# Patient Record
Sex: FEMALE | NOT HISPANIC OR LATINO
[De-identification: names, ages, dates, MRNs, and addresses within clinical notes are randomized per-mention and may not be internally consistent; named-entity substitution may affect disease eponyms.]

---

## 2017-08-19 ENCOUNTER — LAB SERVICES (OUTPATIENT)
Dept: OTHER | Age: 67
End: 2017-08-19

## 2017-08-19 ENCOUNTER — CHARTING TRANS (OUTPATIENT)
Dept: URGENT CARE | Age: 67
End: 2017-08-19

## 2017-08-19 LAB
BILIRUBIN URINE: NEGATIVE
BLOOD URINE: ABNORMAL
CLARITY: ABNORMAL
COLOR: ABNORMAL
GLUCOSE QUALITATIVE U: NEGATIVE
KETONES, URINE: NEGATIVE
LEUKOCYTE ESTERASE URINE: ABNORMAL
NITRITE URINE: NEGATIVE
PH URINE: 7 (ref 5–7)
SPECIFIC GRAVITY URINE: 1.01 (ref 1–1.03)
URINE PROTEIN, QUAL (DIPSTICK): 30
UROBILINOGEN URINE: 0.2

## 2017-08-19 ASSESSMENT — PAIN SCALES - GENERAL: PAINLEVEL_OUTOF10: 0

## 2018-11-28 VITALS
SYSTOLIC BLOOD PRESSURE: 130 MMHG | OXYGEN SATURATION: 96 % | TEMPERATURE: 97.9 F | RESPIRATION RATE: 16 BRPM | DIASTOLIC BLOOD PRESSURE: 84 MMHG | HEART RATE: 68 BPM

## 2019-12-14 ENCOUNTER — WALK IN (OUTPATIENT)
Dept: URGENT CARE | Age: 69
End: 2019-12-14

## 2019-12-14 VITALS
TEMPERATURE: 98.1 F | SYSTOLIC BLOOD PRESSURE: 168 MMHG | RESPIRATION RATE: 14 BRPM | OXYGEN SATURATION: 97 % | HEART RATE: 78 BPM | DIASTOLIC BLOOD PRESSURE: 90 MMHG

## 2019-12-14 DIAGNOSIS — J02.9 SORE THROAT: Primary | ICD-10-CM

## 2019-12-14 LAB
INTERNAL PROCEDURAL CONTROLS ACCEPTABLE: YES
S PYO AG THROAT QL IA.RAPID: NEGATIVE

## 2019-12-14 PROCEDURE — 99204 OFFICE O/P NEW MOD 45 MIN: CPT | Performed by: FAMILY MEDICINE

## 2019-12-14 PROCEDURE — 87880 STREP A ASSAY W/OPTIC: CPT | Performed by: FAMILY MEDICINE

## 2019-12-14 PROCEDURE — 96372 THER/PROPH/DIAG INJ SC/IM: CPT | Performed by: FAMILY MEDICINE

## 2019-12-14 RX ORDER — GLUCOSAMINE SULFATE 500 MG
CAPSULE ORAL
COMMUNITY

## 2019-12-14 RX ORDER — ALBUTEROL SULFATE 90 UG/1
AEROSOL, METERED RESPIRATORY (INHALATION)
Qty: 1 INHALER | Refills: 0 | Status: SHIPPED | OUTPATIENT
Start: 2019-12-14

## 2019-12-14 RX ORDER — RIBOFLAVIN (VITAMIN B2) 100 MG
TABLET ORAL
COMMUNITY

## 2019-12-14 RX ORDER — AZITHROMYCIN 200 MG/5ML
POWDER, FOR SUSPENSION ORAL
Qty: 1 BOTTLE | Refills: 0 | Status: SHIPPED | OUTPATIENT
Start: 2019-12-14 | End: 2019-12-19

## 2019-12-14 RX ORDER — METHYLPREDNISOLONE SODIUM SUCCINATE 125 MG/2ML
125 INJECTION, POWDER, LYOPHILIZED, FOR SOLUTION INTRAMUSCULAR; INTRAVENOUS ONCE
Status: COMPLETED | OUTPATIENT
Start: 2019-12-14 | End: 2019-12-14

## 2019-12-14 RX ORDER — ASPIRIN 81 MG/1
81 TABLET, CHEWABLE ORAL
COMMUNITY

## 2019-12-14 RX ADMIN — METHYLPREDNISOLONE SODIUM SUCCINATE 125 MG: 125 INJECTION, POWDER, LYOPHILIZED, FOR SOLUTION INTRAMUSCULAR; INTRAVENOUS at 10:28

## 2022-02-27 ENCOUNTER — WALK IN (OUTPATIENT)
Dept: URGENT CARE | Age: 72
End: 2022-02-27

## 2022-02-27 VITALS
SYSTOLIC BLOOD PRESSURE: 124 MMHG | HEART RATE: 60 BPM | DIASTOLIC BLOOD PRESSURE: 80 MMHG | TEMPERATURE: 98.2 F | OXYGEN SATURATION: 99 % | RESPIRATION RATE: 14 BRPM

## 2022-02-27 DIAGNOSIS — R30.0 DYSURIA: Primary | ICD-10-CM

## 2022-02-27 DIAGNOSIS — N30.01 ACUTE CYSTITIS WITH HEMATURIA: ICD-10-CM

## 2022-02-27 LAB
APPEARANCE, POC: CLEAR
BILIRUBIN, POC: NEGATIVE
COLOR, POC: YELLOW
GLUCOSE UR-MCNC: NEGATIVE MG/DL
KETONES, POC: NEGATIVE MG/DL
NITRITE, POC: NEGATIVE
OCCULT BLOOD, POC: ABNORMAL
PH UR: 7 [PH] (ref 5–7)
PROT UR-MCNC: NEGATIVE MG/DL
SP GR UR: 1.01 (ref 1–1.03)
UROBILINOGEN UR-MCNC: 0.2 MG/DL (ref 0–1)
WBC (LEUKOCYTE) ESTERASE, POC: ABNORMAL

## 2022-02-27 PROCEDURE — 87086 URINE CULTURE/COLONY COUNT: CPT | Performed by: EMERGENCY MEDICINE

## 2022-02-27 PROCEDURE — 99214 OFFICE O/P EST MOD 30 MIN: CPT | Performed by: EMERGENCY MEDICINE

## 2022-02-27 PROCEDURE — 81002 URINALYSIS NONAUTO W/O SCOPE: CPT | Performed by: EMERGENCY MEDICINE

## 2022-02-27 PROCEDURE — 81015 MICROSCOPIC EXAM OF URINE: CPT | Performed by: EMERGENCY MEDICINE

## 2022-02-27 RX ORDER — NITROFURANTOIN 25; 75 MG/1; MG/1
100 CAPSULE ORAL 2 TIMES DAILY
Qty: 10 CAPSULE | Refills: 0 | Status: SHIPPED | OUTPATIENT
Start: 2022-02-27 | End: 2022-03-04

## 2022-02-28 LAB
RED BLOOD CELLS URINE: ABNORMAL (ref 0–3)
SQUAMOUS EPITHELIAL CELLS: ABNORMAL
WHITE BLOOD CELLS URINE: ABNORMAL (ref 0–5)

## 2022-03-01 LAB — FINAL REPORT: NORMAL

## 2022-12-20 ENCOUNTER — WALK IN (OUTPATIENT)
Dept: URGENT CARE | Age: 72
End: 2022-12-20

## 2022-12-20 VITALS
HEART RATE: 71 BPM | DIASTOLIC BLOOD PRESSURE: 70 MMHG | TEMPERATURE: 98.1 F | OXYGEN SATURATION: 98 % | RESPIRATION RATE: 14 BRPM | SYSTOLIC BLOOD PRESSURE: 130 MMHG

## 2022-12-20 DIAGNOSIS — N30.01 ACUTE CYSTITIS WITH HEMATURIA: ICD-10-CM

## 2022-12-20 DIAGNOSIS — R30.0 DYSURIA: Primary | ICD-10-CM

## 2022-12-20 LAB
APPEARANCE, POC: ABNORMAL
BILIRUBIN, POC: NEGATIVE
COLOR, POC: ABNORMAL
GLUCOSE UR-MCNC: NEGATIVE MG/DL
KETONES, POC: NEGATIVE MG/DL
NITRITE, POC: NEGATIVE
OCCULT BLOOD, POC: ABNORMAL
PH UR: 6 [PH] (ref 5–7)
PROT UR-MCNC: ABNORMAL MG/DL
SP GR UR: >= 1.03 (ref 1–1.03)
UROBILINOGEN UR-MCNC: 0.2 MG/DL (ref 0–1)
WBC (LEUKOCYTE) ESTERASE, POC: ABNORMAL

## 2022-12-20 PROCEDURE — 99214 OFFICE O/P EST MOD 30 MIN: CPT | Performed by: EMERGENCY MEDICINE

## 2022-12-20 PROCEDURE — 87086 URINE CULTURE/COLONY COUNT: CPT | Performed by: CLINICAL MEDICAL LABORATORY

## 2022-12-20 PROCEDURE — 81002 URINALYSIS NONAUTO W/O SCOPE: CPT | Performed by: EMERGENCY MEDICINE

## 2022-12-20 RX ORDER — NITROFURANTOIN 25; 75 MG/1; MG/1
100 CAPSULE ORAL 2 TIMES DAILY
Qty: 10 CAPSULE | Refills: 0 | Status: SHIPPED | OUTPATIENT
Start: 2022-12-20 | End: 2022-12-25

## 2022-12-22 LAB — BACTERIA UR CULT: NORMAL

## 2024-08-15 ENCOUNTER — WALK IN (OUTPATIENT)
Dept: URGENT CARE | Age: 74
End: 2024-08-15

## 2024-08-15 VITALS
RESPIRATION RATE: 18 BRPM | HEART RATE: 67 BPM | DIASTOLIC BLOOD PRESSURE: 77 MMHG | SYSTOLIC BLOOD PRESSURE: 130 MMHG | TEMPERATURE: 98.3 F

## 2024-08-15 DIAGNOSIS — J02.9 SORE THROAT: ICD-10-CM

## 2024-08-15 DIAGNOSIS — I88.9 LYMPHADENITIS: ICD-10-CM

## 2024-08-15 DIAGNOSIS — K12.2 UVULITIS: ICD-10-CM

## 2024-08-15 DIAGNOSIS — J03.90 TONSILLITIS: Primary | ICD-10-CM

## 2024-08-15 LAB
INTERNAL PROCEDURAL CONTROLS ACCEPTABLE: YES
INTERNAL PROCEDURAL CONTROLS ACCEPTABLE: YES
S PYO AG THROAT QL IA.RAPID: NEGATIVE
SARS-COV+SARS-COV-2 AG RESP QL IA.RAPID: NOT DETECTED
TEST LOT EXPIRATION DATE: NORMAL
TEST LOT EXPIRATION DATE: NORMAL
TEST LOT NUMBER: NORMAL
TEST LOT NUMBER: NORMAL

## 2024-08-15 RX ORDER — PREDNISONE 20 MG/1
40 TABLET ORAL DAILY
Qty: 10 TABLET | Refills: 0 | Status: SHIPPED | OUTPATIENT
Start: 2024-08-15 | End: 2024-08-20

## 2024-08-15 RX ORDER — AZITHROMYCIN 250 MG/1
TABLET, FILM COATED ORAL
Qty: 6 TABLET | Refills: 0 | Status: SHIPPED | OUTPATIENT
Start: 2024-08-15 | End: 2024-08-20

## 2024-09-08 ENCOUNTER — WALK IN (OUTPATIENT)
Dept: URGENT CARE | Age: 74
End: 2024-09-08

## 2024-09-08 VITALS
OXYGEN SATURATION: 98 % | HEART RATE: 60 BPM | TEMPERATURE: 97.8 F | SYSTOLIC BLOOD PRESSURE: 172 MMHG | DIASTOLIC BLOOD PRESSURE: 99 MMHG | RESPIRATION RATE: 16 BRPM

## 2024-09-08 DIAGNOSIS — R30.0 DYSURIA: ICD-10-CM

## 2024-09-08 DIAGNOSIS — N39.0 URINARY TRACT INFECTION WITHOUT HEMATURIA, SITE UNSPECIFIED: Primary | ICD-10-CM

## 2024-09-08 LAB
APPEARANCE UR: ABNORMAL
BACTERIA #/AREA URNS HPF: ABNORMAL /HPF
BILIRUB UR QL STRIP: NEGATIVE
COLOR UR: ABNORMAL
GLUCOSE UR STRIP-MCNC: NEGATIVE MG/DL
HGB UR QL STRIP: ABNORMAL
HYALINE CASTS #/AREA URNS LPF: ABNORMAL /LPF
KETONES UR STRIP-MCNC: NEGATIVE MG/DL
LEUKOCYTE ESTERASE UR QL STRIP: ABNORMAL
NITRITE UR QL STRIP: NEGATIVE
PH UR STRIP: 6.5 [PH] (ref 5–7)
PROT UR STRIP-MCNC: ABNORMAL MG/DL
RBC #/AREA URNS HPF: >100 /HPF
SP GR UR STRIP: 1.01 (ref 1–1.03)
SQUAMOUS #/AREA URNS HPF: ABNORMAL /HPF
UROBILINOGEN UR STRIP-MCNC: 0.2 MG/DL
WBC #/AREA URNS HPF: >100 /HPF

## 2024-09-08 PROCEDURE — 99214 OFFICE O/P EST MOD 30 MIN: CPT | Performed by: FAMILY MEDICINE

## 2024-09-08 RX ORDER — SULFAMETHOXAZOLE/TRIMETHOPRIM 800-160 MG
1 TABLET ORAL 2 TIMES DAILY
Qty: 10 TABLET | Refills: 0 | Status: SHIPPED | OUTPATIENT
Start: 2024-09-08 | End: 2024-09-13

## 2024-09-10 LAB — BACTERIA UR CULT: ABNORMAL

## 2024-10-04 ENCOUNTER — TELEPHONE (OUTPATIENT)
Dept: UROLOGY | Facility: CLINIC | Age: 74
End: 2024-10-04

## 2024-10-04 NOTE — TELEPHONE ENCOUNTER
TC from pt to RN line saying she had surgery w/ Dr Murrell in '14. Has some questions for the RN.  TC back to pt. LM on VM.

## 2024-11-12 ENCOUNTER — OFFICE VISIT (OUTPATIENT)
Dept: UROLOGY | Facility: CLINIC | Age: 74
End: 2024-11-12
Attending: OBSTETRICS & GYNECOLOGY
Payer: MEDICARE

## 2024-11-12 VITALS
SYSTOLIC BLOOD PRESSURE: 132 MMHG | WEIGHT: 122 LBS | HEIGHT: 66 IN | DIASTOLIC BLOOD PRESSURE: 76 MMHG | BODY MASS INDEX: 19.61 KG/M2

## 2024-11-12 DIAGNOSIS — N95.2 POSTMENOPAUSAL ATROPHIC VAGINITIS: ICD-10-CM

## 2024-11-12 DIAGNOSIS — R31.0 GROSS HEMATURIA: ICD-10-CM

## 2024-11-12 DIAGNOSIS — R35.1 NOCTURIA: ICD-10-CM

## 2024-11-12 DIAGNOSIS — N39.3 FEMALE STRESS INCONTINENCE: Primary | ICD-10-CM

## 2024-11-12 DIAGNOSIS — N39.41 URGE INCONTINENCE: ICD-10-CM

## 2024-11-12 LAB
BLOOD URINE: NEGATIVE
CONTROL RUN WITHIN 24 HOURS?: YES
LEUKOCYTE ESTERASE URINE: NEGATIVE
NITRITE URINE: NEGATIVE

## 2024-11-12 PROCEDURE — 87086 URINE CULTURE/COLONY COUNT: CPT | Performed by: OBSTETRICS & GYNECOLOGY

## 2024-11-12 PROCEDURE — 81002 URINALYSIS NONAUTO W/O SCOPE: CPT | Performed by: OBSTETRICS & GYNECOLOGY

## 2024-11-12 PROCEDURE — 88108 CYTOPATH CONCENTRATE TECH: CPT | Performed by: OBSTETRICS & GYNECOLOGY

## 2024-11-12 PROCEDURE — 99202 OFFICE O/P NEW SF 15 MIN: CPT

## 2024-11-12 RX ORDER — ESTRADIOL 0.1 MG/G
CREAM VAGINAL
Qty: 42 G | Refills: 3 | Status: SHIPPED | OUTPATIENT
Start: 2024-11-12

## 2024-11-12 RX ORDER — GLUCOSAMINE SULFATE 500 MG
CAPSULE ORAL
COMMUNITY

## 2024-11-12 RX ORDER — SULFAMETHOXAZOLE AND TRIMETHOPRIM 400; 80 MG/1; MG/1
1 TABLET ORAL AS NEEDED
COMMUNITY

## 2024-11-12 NOTE — PROGRESS NOTES
Rosalina Mustafa, DO  2024     Referred by Dr. Field  Pt here with self    Chief Complaint   Patient presents with    Incontinence     AUDI/UUI  Referred by   Previous pt of [facility], Cysto 2014    Other     Hematuria        HPI:  +AUDI  +UUI  Nocturia x3  No prolapse sx  No dyspareunia (+vag dryness, uses lube)  Reg bowels    PRIOR TREATMENTS:    Kegels  Surgery:  MUS, cysto  Abd hyst with [place]  B/l mastectomy for breast ca    No clear UTIs, she c/o bladder spasms and gross hematuria  Ucx no growth  Coital trigger, bactrim ss w/ coitus    + gross hematuria      UUI =AUDI  RN    Vitals:  /76   Ht 66\"   Wt 122 lb (55.3 kg)   BMI 19.69 kg/m²      HISTORY:  History reviewed. No pertinent past medical history.   Past Surgical History:   Procedure Laterality Date    Midurethral sling        History reviewed. No pertinent family history.   Social History     Socioeconomic History    Marital status:    Tobacco Use    Smoking status: Former     Types: Cigarettes    Smokeless tobacco: Never   Substance and Sexual Activity    Alcohol use: Yes     Alcohol/week: 14.0 standard drinks of alcohol     Types: 14 Glasses of wine per week    Drug use: Never        Allergies:  Allergies[1]    Medications:  Medications Prior to Visit[2]    Urogynecology Summary:  Urogynecology Summary  Prolapse: No  AUDI: Yes  Urge Incontinence: Yes  Nocturia Frequency: 3  Frequency: 1 - 2 hours  Incomplete emptying: No  Constipation: No  Activities are limited by UI/POP?: No  Currently Sexually Active: Yes    Review of Systems:    A comprehensive 12 point review of systems was completed.  Pertinent positives noted in the the HPI.  No CP  No SOB    GENERAL EXAM:  GENERAL:  Alert and Oriented, and NAD  HEENT:  Normal, no lesions  LUNGS:  Normal effort  HEART:  RRR  ABDOMEN: soft, no mass, no hernia  EXTREM:  Normal, no edema  SKIN:  Normal, no lesions    PELVIC EXAM:  Ext. Gen: +atrophy, no lesions  Urethra:  +atrophy, nontender  Bladder:+fullness, nontender  Vagina: +atrophy  Cervix & Uterus: absent  Adnexa:no masses, nontender  Perineum: nontender  Anus: wnl  Rectum: defer    PELVIS FLOOR NEUROMUSCULAR FUNCTION:  Strength:  1 and Unable to hold greater than 3 sec  Perineal Sensation:  Normal      PELVIC SUPPORT:  [place]:  0  Ant:  0  Post:  0  CST:  negative  UVJ: somewhat hypermobile    Pt examined with chaperone, RN    Impression/Plan:    ICD-10-CM    1. Female stress incontinence  N39.3       2. Urge incontinence  N39.41       3. Nocturia  R35.1       4. Gross hematuria  R31.0 US KIDNEYS (CPT=76775)      5. Postmenopausal atrophic vaginitis  N95.2 estradiol (ESTRACE) 0.1 MG/GM Vaginal Cream          Discussion Items:   Urodynamics and cystoscopy for evaluation of LUTS  Behavioral and pharmacologic treatments for OAB  Nonsurgical and surgical treatments for Stress Urinary Incontinence  Pelvic muscle rehabilitation including pelvic floor PT  Topical estrogen therapy for treating UGA  Discussed hematuria w/u with renal ultrasound. Cysto, & cytology  Discussed dietary and behavioral modification, discussed pharmacologic and nonpharmacologic mgmt options for urinary symptoms.     Discussed mgmt of vulvovaginal atrophy with vaginal estrogen cream. Reviewed associated benefits, risks, alternatives, and goals. Recommend low dose twice weekly mgmt     Discussed management options for AUDI including expectant management, pelvic floor PT, pessary, and surgery  Discussed risks and benefits associated with each option  Discussed urodynamic testing    Discussed dietary and behavioral modifications for mgmt of urinary symptoms  Discussed weight management and benefits of weight loss on urinary symptoms  Reviewed AUA/SUFU guidelines on mgmt of non-neurogenic OAB  Discussed pharmacologic and nonpharmacologic mgmt options of urinary symptoms - reviewed risks, benefits, alternatives, and goals of treatment  Discussed specific risks  related to OAB meds including, but not limited to dry mouth, constipation, blurry vision, cognitive changes, and BP elevation.    Diagnostic Items:  Urodynamics  Cystoscopy  Renal Ultrasound  Urine testing    Discussed management of recurrent urinary tract infections. Discussed use of pharmacologic treatment options for suppression therapy. Risks vs benefits reviewed with patient       Medications Discussed:  Estrace Cream  Vag moisturizers  Lube w/ coitus    Treatment Plan, Non-surgical:   RN teaching/pt education done  Estrace / Premarin cream    Treatment Plan, Surgical:   None    Pt verbalizes understanding of all above discussed information. All questions answered. She agrees to plan    Return for UDS & f/u with cysto.    Rosalina Mustafa, DO, FACOG, FACS      Discussion undertaken in English, info provided      The 21st Century Cures Act makes medical notes like these available to patients in the interest of transparency. However, be advised this is a medical document. It is intended as peer to peer communication. It is written in medical language and may contain abbreviations or verbiage that are unfamiliar. It may appear blunt or direct. Medical documents are intended to carry relevant information, facts as evident, and the clinical opinion of the practitioner.          [1]   Allergies  Allergen Reactions    Ciprofloxacin NAUSEA AND VOMITING   [2]   Outpatient Medications Prior to Visit   Medication Sig Dispense Refill    sulfamethoxazole-trimethoprim 400-80 MG Oral Tab Take 1 tablet by mouth as needed.      Calcium 200 MG Oral Tab Take by mouth.      VITAMIN D, CHOLECALCIFEROL, OR Take by mouth.      Dermatological Products, Misc. (EPICERAM EX) Apply topically.      Ascorbic Acid (JR-C OR) Take by mouth.      Glucosamine 500 MG Oral Cap Take by mouth.       No facility-administered medications prior to visit.

## 2024-11-14 ENCOUNTER — TELEPHONE (OUTPATIENT)
Dept: UROLOGY | Facility: CLINIC | Age: 74
End: 2024-11-14

## 2024-11-14 RX ORDER — SODIUM FLUORIDE AND POTASSIUM NITRATE 5.8; 57.5 MG/ML; MG/ML
GEL, DENTIFRICE DENTAL
COMMUNITY
Start: 2024-06-10

## 2024-11-14 RX ORDER — TRIAMCINOLONE ACETONIDE 1 MG/G
CREAM TOPICAL
COMMUNITY
Start: 2024-09-30

## 2024-11-14 RX ORDER — ACETAMINOPHEN 160 MG
50 TABLET,DISINTEGRATING ORAL DAILY
COMMUNITY

## 2024-11-14 RX ORDER — VIT C/B6/B5/MAGNESIUM/HERB 173 50-5-6-5MG
CAPSULE ORAL
COMMUNITY
Start: 2022-10-27

## 2024-11-14 RX ORDER — ASPIRIN 81 MG/1
81 TABLET, CHEWABLE ORAL AS DIRECTED
COMMUNITY

## 2024-11-14 NOTE — TELEPHONE ENCOUNTER
Call returned to pt, questioning why there is an order for a CT urogram in [facility]. She completed renal US yesterday. Able to review orders and results in Care Everywhere and explained to pt that US showed some shadowing that CT can give better assessment of. Pt appreciative of call and agreeable to scheduling appointment.

## 2024-11-15 NOTE — TELEPHONE ENCOUNTER
Sandy Daily, PA reviewed urine cytology,     TORB Cytology reviewed: Wnl  Repeat cytology at time of scheduled cysto    Called patient and notified of results and recommendations.  Pt verbalizes understanding and agreeable to plan.  Has CT scheduled 11/21/24.  Answered patient questions about vaginal moisturizer.  Pt refuses Estrace at this time d/t hx breast CA.

## 2024-12-02 ENCOUNTER — TELEPHONE (OUTPATIENT)
Dept: UROLOGY | Facility: CLINIC | Age: 74
End: 2024-12-02

## 2024-12-19 ENCOUNTER — OFFICE VISIT (OUTPATIENT)
Dept: UROLOGY | Facility: CLINIC | Age: 74
End: 2024-12-19
Attending: OBSTETRICS & GYNECOLOGY
Payer: MEDICARE

## 2024-12-19 VITALS
HEIGHT: 66 IN | WEIGHT: 122 LBS | SYSTOLIC BLOOD PRESSURE: 138 MMHG | DIASTOLIC BLOOD PRESSURE: 70 MMHG | BODY MASS INDEX: 19.61 KG/M2

## 2024-12-19 DIAGNOSIS — N39.3 FEMALE STRESS INCONTINENCE: ICD-10-CM

## 2024-12-19 DIAGNOSIS — R31.0 GROSS HEMATURIA: ICD-10-CM

## 2024-12-19 DIAGNOSIS — R35.1 NOCTURIA: ICD-10-CM

## 2024-12-19 DIAGNOSIS — N39.41 URGE INCONTINENCE: Primary | ICD-10-CM

## 2024-12-19 PROCEDURE — 88108 CYTOPATH CONCENTRATE TECH: CPT

## 2024-12-19 PROCEDURE — 51741 ELECTRO-UROFLOWMETRY FIRST: CPT

## 2024-12-19 PROCEDURE — 51797 INTRAABDOMINAL PRESSURE TEST: CPT

## 2024-12-19 PROCEDURE — 51784 ANAL/URINARY MUSCLE STUDY: CPT

## 2024-12-19 PROCEDURE — 51729 CYSTOMETROGRAM W/VP&UP: CPT

## 2024-12-19 PROCEDURE — 81002 URINALYSIS NONAUTO W/O SCOPE: CPT

## 2024-12-19 NOTE — PATIENT INSTRUCTIONS
[facility] UR[facility]YNECOLOGY  [address] [address]  PH: [phone]  FAX: [phone]       Urodynamic Testing Discharge Instructions:    There are NO dietary or activity restrictions.  You may resume your normal schedule.      You may have mild discomfort for a few hours after your testing today.  There may be some mild burning when you urinate or you may see some blood in your urine.  These problems should not last more than 24 hours.  The following suggestions may minimize any symptoms you experience.    Drink 6-8 large glasses of water over the next 8 hours  A compress or sitz bath may be soothing  Tylenol or Ibuprofen may be taken as needed    If you experience any of the following, please call the office or, if after hours, the on-call physician at [phone].    Excessive pain  Bright red bloody discharge  Fever or chills  Continued urgency, frequency or burning with urination    Obtaining Test Results    Your urodynamic test will be interpreted by a specialist and available to the referring physician within 7-14 days.  Patients in our clinic are given an appointment to come back to discuss the results and any appropriate treatment recommendations.    Please do not hesitate to contact our office with any questions or concerns at [phone].    I acknowledge that I have received verbal and written discharge  instructions and that I understand these instructions clearly.    Patient Signature:    Date:

## 2024-12-19 NOTE — PROCEDURES
Patient here for urodynamic testing.  Procedure explained and confirmed by patient.  See evaluation form for results.  Both verbal and written discharge instructions were given.  Patient tolerated procedure well and will follow up with Dr. Rosalina Mustafa on 1/10/25 for UDS f/u and cysto for gross hematuria.    URODYNAMIC EVALUATION    PATIENT HISTORY:    Prolapse:  No  AUDI:  Yes, occasional with sneeze  UUI:  Yes, more bothersome  Nocturia:  3  Frequency:  every 1-2 hours  Sense of Incomplete Emptying:  No  Constipation:  No  Last void prior to UDS testin+hr   Current urge to void?  Moderate  OAB meds stopped prior to test?  NA  Other symptoms?      Surgery?  [x]  No  []  Interested in surgery:   []  Yes, specify date:    Surgical folder provided?  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  []  Yes  [x]  No  []  Pessary  []  Manual  []  Half Speculum    Additional Notes:    UROFLOWMETRY:  Unreduced  Voided Volume:               714              mL  Maximum Flow Rate:                      23          mL/sec  Average flow rate:                12             mL/sec  Post-void Residual:             105              mL  Pattern:  [x]  Normal  []  Poor flow     []  Intermittent  []  Other  Void:   [x]  Typical  []  Atypical    Additional Notes:  Pt felt she was completely empty after void. Urine collected  and sent for cytology for upcoming gross hematuria workup including office cysto.      CYSTOMETRY:  Urethral Catheter:  Fr 7 / tdoc  Abd Catheter:     Fr 7 / tdoc   Infusion:  Water Rate 30 mL/min decreased to 20mL/min with onset of DO  Temp:  Room  Position:  [x]  Sit  []  Stand  []  Supine  First sensation:   181 mL  First desire to void:   186 mL  Strong desire to void:  196 mL  Maximum cystometric capacity:   305 mL  Detrusor Activity:  [x]  Unstable   []  Stable  Urge leakage?    []  Yes [x]  No  Volume at 1st unhibited detrusor cont:   262 mL  Detrusor instability provoked by:    []  Spontaneous []  Coughing  [x]  Filling  []  Valsalva  []  Other    Additional Notes:      URETHRAL FUNCTION:  Valsava (vesical) Leak Point Pressures:    Volume Leak Point Pressure Leak?    Cough Valsalva      100mL 102  105   cm H2O []  Yes [x]  No   200mL 130  112   cm H2O []  Yes [x]  No   300mL 144  118   cm H2O []  Yes [x]  No     Genuine Stress Incontinence demonstrated?   []  Yes  [x]  No    Resting Urethral Pressure Profile:     Functional Urethral Length:         0.6 cm         0.7        cm     Maximum UCP:         50  cm           52  cm       PRESSURE/FLOW STUDY:  Unreduced  Voided volume:      375  mL  Maximum flow rate:      12  mL/sec  Pressure Detrusor (at maximum flow):       53     cm H2O  Post void residual:       65        mL  per bladder scan  Voiding mechanism:  []  Abnormal  [x]  Normal  []  Strain to void   []  Weak detrusor    Additional Notes:  Patient voided Pves out.  PVR measured per bladder scan.  Urine from uroflow collected and sent for cytology.   Uroflowmetry, cystometry, and pressure / flow study were completed using calibrated electronic equipment and air charged transducers.    EMG:  During fill: Reactive    During flow study: Reactive    12/19/2024 3:36 PM     PERFORMED BY:  Kevin ZELAYA RN      URODYNAMIC PHYSICIAN INTERPRETATION    IMPRESSION:   714/105cc & 375/65cc  long-term 305cc  DO @ 262cc  No  AUDI  Post-Procedure Diagnoses: Detrusor instability [N31.9] and Incomplete bladder emptying [R39.14]    Comments:      Rosalina Mustafa,    12/19/2024   8:18 PM             [1]   Outpatient Medications Marked as Taking for the 12/19/24 encounter (Office Visit) with LIS PROCEDURE   Medication Sig Dispense Refill    PREVIDENT 5000 SENSITIVE 1.1-5 % Dental Gel USE TWICE DAILY IN PLACE OF REGULAR TOOTHPASTE      triamcinolone 0.1 % External Cream APPLY A THIN LAYER TOPICALLY TO LEGS TWICE A DAY FOR TWO WEEKS. REST ONE WEEK AND REPEAT AS NEEDED.      Turmeric (CURCUMIN 95) 500 MG Oral Cap Curcumin 95 500 MG Oral Capsule QTY: 0 capsule Days: 0 Refills: 0  Written: 10/27/22 Patient Instructions:      ascorbic acid 250 MG Oral Tab Take 2 tablets (500 mg total) by mouth 2 (two) times daily.      cholecalciferol 50 MCG (2000 UT) Oral Cap Take 50 mcg by mouth daily.      Calcium Carb-Cholecalciferol (CALCIUM 500+D OR) Take 1 tablet by mouth in the morning and 1 tablet before bedtime.      sulfamethoxazole-trimethoprim 400-80 MG Oral Tab Take 1 tablet by mouth as needed.      Dermatological Products, Misc. (EPICERAM EX) Apply topically.      Glucosamine 500 MG Oral Cap Take by mouth.

## 2025-01-06 ENCOUNTER — TELEPHONE (OUTPATIENT)
Dept: UROLOGY | Facility: HOSPITAL | Age: 75
End: 2025-01-06

## 2025-01-06 NOTE — TELEPHONE ENCOUNTER
Reviewed cytology results w/ Dr Graham, pathology  Plan to repeat cystology at time of scheduled cysto  Proceed with hematuria w/u as scheduled

## 2025-01-07 ENCOUNTER — OFFICE VISIT (OUTPATIENT)
Dept: UROLOGY | Facility: CLINIC | Age: 75
End: 2025-01-07
Attending: OBSTETRICS & GYNECOLOGY
Payer: MEDICARE

## 2025-01-07 ENCOUNTER — TELEPHONE (OUTPATIENT)
Dept: UROLOGY | Facility: CLINIC | Age: 75
End: 2025-01-07

## 2025-01-07 VITALS
BODY MASS INDEX: 20 KG/M2 | SYSTOLIC BLOOD PRESSURE: 122 MMHG | DIASTOLIC BLOOD PRESSURE: 70 MMHG | HEIGHT: 66 IN | TEMPERATURE: 98 F

## 2025-01-07 DIAGNOSIS — N39.41 URGE INCONTINENCE: ICD-10-CM

## 2025-01-07 DIAGNOSIS — N32.81 DETRUSOR INSTABILITY: ICD-10-CM

## 2025-01-07 DIAGNOSIS — R31.29 MICROHEMATURIA: Primary | ICD-10-CM

## 2025-01-07 PROCEDURE — 88108 CYTOPATH CONCENTRATE TECH: CPT | Performed by: OBSTETRICS & GYNECOLOGY

## 2025-01-07 PROCEDURE — 52000 CYSTOURETHROSCOPY: CPT

## 2025-01-07 PROCEDURE — 81002 URINALYSIS NONAUTO W/O SCOPE: CPT | Performed by: OBSTETRICS & GYNECOLOGY

## 2025-01-07 RX ORDER — LIDOCAINE HYDROCHLORIDE 20 MG/ML
10 JELLY TOPICAL ONCE
Status: COMPLETED | OUTPATIENT
Start: 2025-01-07 | End: 2025-01-07

## 2025-01-07 RX ADMIN — LIDOCAINE HYDROCHLORIDE 10 ML: 20 JELLY TOPICAL at 14:15:00

## 2025-01-07 NOTE — TELEPHONE ENCOUNTER
VM left for pt relaying KJ's answer to her question if she should continue post coital antibiotic prescribed by another provider. Informed via message that doctor recommends  Pt continue Estrace cream as prescribed to help prevent UTI symptoms.

## 2025-01-07 NOTE — PROGRESS NOTES
[facility] at  [facility] [place] [facility]  [address]  [facility] [address]  PH: [phone]  FAX: [phone]     Date Patient MRN   [id number] Juana Spears   [id number] [facility] [address]  [id number]      Dr. Rosalina Mustafa DO     Patient Name:  Juana Spears   Patient : 3/27/1950 Patient Age: 74 year old   Referring Physician:  No ref. provider found    Vitals   /70   Temp 98.4 °F (36.9 °C)   Ht 66\"   BMI 19.69 kg/m²       Medications   Encounter Medications[1]      Allergies   Allergies were reviewed with positive findings listed below:  Cefdinir, Ciprofloxacin, and Seasonal      Chief Complaint   Chief Complaint   Patient presents with    Cystourethroscopy Procedure    UDS Follow-up        URINALYSIS:  Leukocyte esterase urine   Date Value Ref Range Status   2025 Negative Negative Final     Nitrite Urine   Date Value Ref Range Status   2025 Negative Negative Final     Blood Urine   Date Value Ref Range Status   2025 Negative Negative Final         Procedure        PRE-OP DIAGNOSIS: mirco hematuria    POST-OP DIAGNOSIS: same    ANESTHESIA: 2% Lidocaine gel    PROCEDURES:  After sterile prep with Betadine the urethral lumen was prepped via catheter injection of approx. 3 mL 2% Lidocaine gel.  The urethra was gently dilated to accommodate the Cystoscope.  Infusion of sterile water via the cystoscope was performed to achieve a comfortable bladder distension to allow a full view.  A careful, systemic assessment of the complete lumen of the bladder and urethra was performed.    SPECIMENS: urine cytology    DESCRIPTION Bladder Trigone UV  Junction URETHRA       Proximal Mild Distal   Negative/NL x  x x x x   Squamous Metaplasia  x       Polyps         Diverticulum         Other (exudate, cysts)           Bladder Trabeculation: moderate    Micro hematuria  Discussed cytology results with pt, plan to repeat today  Renal  ultrasound showed stone, CT urogram -> punctate rt renal stone (nonobstructing), renal cyst left side. No hydro  Cysto d/w pt      Electronically signed by Rosalina Mustafa DO    Pt presents w/ initial c/o UI  Urodynamic testing undergone without complication.  Results reviewed with patient  714/105cc & 375/65cc  [facility] 305cc  DO @ 262cc  No AUDI    Discussed with patient mgmt options for DO/UUI  Biggest bother is UUI  Not keen on meds    Discussed dietary and behavioral modifications for mgmt of urinary symptoms  Discussed weight management and benefits of weight loss on urinary symptoms  Reviewed AUA/SUFU guidelines on mgmt of non-neurogenic OAB  Discussed pharmacologic and nonpharmacologic mgmt options of urinary symptoms - reviewed risks, benefits, alternatives, and goals of treatment  Discussed specific risks related to OAB meds including, but not limited to dry mouth, constipation, blurry vision, cognitive changes, and BP elevation.    Discussed mgmt of vulvovaginal atrophy with vaginal estrogen cream. Reviewed associated benefits, risks, alternatives, and goals. Recommend low dose twice weekly mgmt     All questions answered.  Pt will proceed PFPT  Cont vag estrogen  Bladder diet/drill    Follow up after PT, sooner prn    Dr. Rosalina Mustafa             [1]   Outpatient Encounter Medications as of 1/7/2025   Medication Sig Dispense Refill    estradiol (ESTRACE) 0.1 MG/GM Vaginal Cream Apply 1/2 gram vaginally 2 times per week. 42 g 3    PREVIDENT 5000 SENSITIVE 1.1-5 % Dental Gel USE TWICE DAILY IN PLACE OF REGULAR TOOTHPASTE      triamcinolone 0.1 % External Cream APPLY A THIN LAYER TOPICALLY TO LEGS TWICE A DAY FOR TWO WEEKS. REST ONE WEEK AND REPEAT AS NEEDED.      Turmeric (CURCUMIN 95) 500 MG Oral Cap Curcumin 95 500 MG Oral Capsule QTY: 0 capsule Days: 0 Refills: 0  Written: 10/27/22 Patient Instructions:      ascorbic acid 250 MG Oral Tab Take 2 tablets (500 mg total) by mouth 2 (two) times daily.       cholecalciferol 50 MCG ( UT) Oral Cap Take 50 mcg by mouth daily.      Calcium Carb-Cholecalciferol (CALCIUM 500+D OR) Take 1 tablet by mouth in the morning and 1 tablet before bedtime.      sulfamethoxazole-trimethoprim 400-80 MG Oral Tab Take 1 tablet by mouth as needed.      Dermatological Products, Misc. (EPICERAM EX) Apply topically.      Glucosamine 500 MG Oral Cap Take by mouth.      [] lidocaine (Urojet) 2 % urethral jelly 10 mL        No facility-administered encounter medications on file as of 2025.

## 2025-01-09 LAB — NON GYNE INTERPRETATION: NEGATIVE

## 2025-01-30 ENCOUNTER — TELEPHONE (OUTPATIENT)
Dept: UROLOGY | Facility: CLINIC | Age: 75
End: 2025-01-30

## 2025-01-30 NOTE — TELEPHONE ENCOUNTER
Patient LM regarding vaginal itching.   RN called back. Patient is on regimen of estrace 2x/week, replens, 3x per week. C/o itching during the day.   Offered vaginal swab,  urine culture, offered appointment to discuss alternative forms of estrogen cream. Patient declines at this time. She states she will call back if symptoms worsen or don't resolve.  Discussed alternate schedule for replens or trying different brand of vaginal moisturizer. Pt verbalized an understanding and agrees w/ plan. All questions answered.

## 2025-04-21 ENCOUNTER — VIRTUAL PHONE E/M (OUTPATIENT)
Dept: UROLOGY | Facility: CLINIC | Age: 75
End: 2025-04-21
Attending: OBSTETRICS & GYNECOLOGY
Payer: MEDICARE

## 2025-04-21 DIAGNOSIS — N81.84 PELVIC MUSCLE WASTING: ICD-10-CM

## 2025-04-21 DIAGNOSIS — N32.81 DETRUSOR INSTABILITY: Primary | ICD-10-CM

## 2025-04-21 DIAGNOSIS — R35.1 NOCTURIA: ICD-10-CM

## 2025-04-21 DIAGNOSIS — N95.2 POSTMENOPAUSAL ATROPHIC VAGINITIS: ICD-10-CM

## 2025-04-21 DIAGNOSIS — N39.41 URGE INCONTINENCE: ICD-10-CM

## 2025-04-21 NOTE — PROGRESS NOTES
This visit is being conducted as a phone (audio only) visit with patient's consent.  Patient declined video visit due to technical capacity.  Patient is in a safe, private environment for televisit, provider located in the office setting.    Visit for f/u of UUI    Unable to get in for PFPT  Has been doing home PF exercises    Using vag estrogen 2x weekly and vag moisturizer 2x weekly    Reports ongoing sx of urgency and UUI  Nocturia x2    Bowels reg     Denies any current signs or symptoms of UTI    Some interest in PFPT  Not keen on meds    Impression/Plan:    ICD-10-CM    1. Detrusor instability  N32.81       2. Urge incontinence  N39.41       3. Nocturia  R35.1       4. Postmenopausal atrophic vaginitis  N95.2       5. Pelvic muscle wasting  N81.84           Discussion Items:   Discussed mgmt of vulvovaginal atrophy with vaginal estrogen cream. Reviewed associated benefits, risks, alternatives, and goals. Recommend low dose 2x/week treatment.    Discussed dietary and behavioral modifications for mgmt of urinary symptoms.  Discussed weight management and benefits of weight loss on urinary symptoms.  Reviewed AUA/SUFU guidelines on mgmt of non-neurogenic OAB.  Discussed pharmacologic and nonpharmacologic mgmt options of urinary symptoms - reviewed risks, benefits, alternatives, and goals of treatment.  Discussed specific risks related to OAB meds including, but not limited to dry mouth, constipation, blurry vision, cognitive changes, and BP elevation.  -- mentioned trospium, mirabegron, Gemtesa      Treatment Plan:  Pt considering PFPT, has Rx  Continue vag estrogen as prescribed  Bowel regimen  Bladder diet/drill  Pelvic floor exercises  Call with s/sx of UTI    All questions answered  She understands and agrees to plan    Return in about 4 months (around 8/21/2025) for PFPT f/u (phone).    Bonita Palma PA-C    A total of 11 minutes were spent in discussion with the patient and/or family on this encounter in  addition to time spent on chart review and documentation.    Note to patient: The 21st Century Cures Act makes medical notes like these available to patients in the interest of transparency.  However, be advised this is a medical document.  It is intended as peer to peer communication.  It is written in medical language and may contain abbreviations or verbiage that are unfamiliar.  It may appear blunt or direct.  Medical documents are intended to carry relevant information, facts as evident, and the clinical opinion of the practitioner.

## 2025-06-11 ENCOUNTER — TELEPHONE (OUTPATIENT)
Dept: UROLOGY | Facility: HOSPITAL | Age: 75
End: 2025-06-11

## 2025-06-11 NOTE — TELEPHONE ENCOUNTER
Incoming fax received from [facility] with progress notes and updated POC from patient's most recent PFPT. Reviewed by physician and signed. Scanned into [facility] and faxed with confirmation by PSAlexandra LONGORIA.

## 2025-07-03 ENCOUNTER — TELEPHONE (OUTPATIENT)
Dept: UROLOGY | Facility: HOSPITAL | Age: 75
End: 2025-07-03

## 2025-07-03 NOTE — TELEPHONE ENCOUNTER
[facility] fax from [facility] with PFPT update.  Forms scanned into [facility].

## 2025-08-01 ENCOUNTER — TELEPHONE (OUTPATIENT)
Dept: UROLOGY | Facility: CLINIC | Age: 75
End: 2025-08-01

## 2025-08-18 ENCOUNTER — VIRTUAL PHONE E/M (OUTPATIENT)
Dept: UROLOGY | Facility: CLINIC | Age: 75
End: 2025-08-18
Attending: OBSTETRICS & GYNECOLOGY

## 2025-08-18 ENCOUNTER — TELEPHONE (OUTPATIENT)
Dept: UROLOGY | Facility: CLINIC | Age: 75
End: 2025-08-18

## 2025-08-18 DIAGNOSIS — N32.81 DETRUSOR INSTABILITY: Primary | ICD-10-CM

## 2025-08-18 DIAGNOSIS — N81.84 PELVIC MUSCLE WASTING: ICD-10-CM

## 2025-08-18 DIAGNOSIS — N39.41 URGE INCONTINENCE: ICD-10-CM

## 2025-08-18 DIAGNOSIS — N95.2 POSTMENOPAUSAL ATROPHIC VAGINITIS: ICD-10-CM
